# Patient Record
Sex: MALE | Race: ASIAN | NOT HISPANIC OR LATINO | Employment: FULL TIME | URBAN - METROPOLITAN AREA
[De-identification: names, ages, dates, MRNs, and addresses within clinical notes are randomized per-mention and may not be internally consistent; named-entity substitution may affect disease eponyms.]

---

## 2022-10-31 ENCOUNTER — LAB REQUISITION (OUTPATIENT)
Dept: LAB | Facility: CLINIC | Age: 32
End: 2022-10-31

## 2022-10-31 PROCEDURE — 86481 TB AG RESPONSE T-CELL SUSP: CPT | Performed by: INTERNAL MEDICINE

## 2022-11-02 LAB
GAMMA INTERFERON BACKGROUND BLD IA-ACNC: 0.02 IU/ML
M TB IFN-G BLD-IMP: NEGATIVE
M TB IFN-G CD4+ BCKGRND COR BLD-ACNC: 9.98 IU/ML
MITOGEN IGNF BCKGRD COR BLD-ACNC: 0.01 IU/ML
MITOGEN IGNF BCKGRD COR BLD-ACNC: 0.01 IU/ML
QUANTIFERON MITOGEN: 10 IU/ML
QUANTIFERON NIL TUBE: 0.02 IU/ML
QUANTIFERON TB1 TUBE: 0.03 IU/ML
QUANTIFERON TB2 TUBE: 0.03

## 2023-06-14 ENCOUNTER — OFFICE VISIT (OUTPATIENT)
Dept: FAMILY MEDICINE | Facility: CLINIC | Age: 33
End: 2023-06-14
Payer: COMMERCIAL

## 2023-06-14 VITALS
HEIGHT: 70 IN | TEMPERATURE: 97.2 F | RESPIRATION RATE: 16 BRPM | DIASTOLIC BLOOD PRESSURE: 80 MMHG | BODY MASS INDEX: 28.35 KG/M2 | SYSTOLIC BLOOD PRESSURE: 134 MMHG | HEART RATE: 92 BPM | OXYGEN SATURATION: 97 % | WEIGHT: 198 LBS

## 2023-06-14 DIAGNOSIS — G57.61 MORTON'S NEUROMA OF RIGHT FOOT: ICD-10-CM

## 2023-06-14 DIAGNOSIS — M72.2 PLANTAR FASCIITIS: Primary | ICD-10-CM

## 2023-06-14 PROCEDURE — 99203 OFFICE O/P NEW LOW 30 MIN: CPT | Performed by: FAMILY MEDICINE

## 2023-06-14 ASSESSMENT — PAIN SCALES - GENERAL: PAINLEVEL: NO PAIN (1)

## 2023-07-28 ENCOUNTER — MEDICAL CORRESPONDENCE (OUTPATIENT)
Dept: HEALTH INFORMATION MANAGEMENT | Facility: CLINIC | Age: 33
End: 2023-07-28
Payer: COMMERCIAL

## 2023-07-29 NOTE — TELEPHONE ENCOUNTER
DIAGNOSIS:   Plantar fasciitis [M72.2]  - Primary      Thomas's neuroma of right foot [G57.61]         APPOINTMENT DATE: 09/06/2023   NOTES STATUS DETAILS   OFFICE NOTE from referring provider Internal 06/14/2023 - Jacinta ARNDT    MEDICATION LIST Internal

## 2023-08-07 ENCOUNTER — VIRTUAL VISIT (OUTPATIENT)
Dept: FAMILY MEDICINE | Facility: CLINIC | Age: 33
End: 2023-08-07
Payer: COMMERCIAL

## 2023-08-07 DIAGNOSIS — M72.2 PLANTAR FASCIITIS: Primary | ICD-10-CM

## 2023-08-07 DIAGNOSIS — G57.62 MORTON'S NEURALGIA, LEFT: ICD-10-CM

## 2023-08-07 PROCEDURE — 99214 OFFICE O/P EST MOD 30 MIN: CPT | Mod: VID | Performed by: FAMILY MEDICINE

## 2023-08-07 RX ORDER — METHYLPREDNISOLONE 4 MG
TABLET, DOSE PACK ORAL
Qty: 21 TABLET | Refills: 0 | Status: SHIPPED | OUTPATIENT
Start: 2023-08-07 | End: 2023-10-18

## 2023-08-07 NOTE — PROGRESS NOTES
Alessio is a 33 year old who is being evaluated via a billable video visit.      How would you like to obtain your AVS? MyChart  If the video visit is dropped, the invitation should be resent by: Text to cell phone: [phone]  Will anyone else be joining your video visit? No    Assessment & Plan     Alessio was seen today for medication request and musculoskeletal problem.    Diagnoses and all orders for this visit:    Plantar fasciitis  -     methylPREDNISolone (MEDROL DOSEPAK) 4 MG tablet therapy pack; Follow Package Directions  -     diclofenac (VOLTAREN) 50 MG EC tablet; Take 1 tablet (50 mg) by mouth daily as needed for moderate pain (right foot)    Thomas's neuralgia, left  -     methylPREDNISolone (MEDROL DOSEPAK) 4 MG tablet therapy pack; Follow Package Directions  -     diclofenac (VOLTAREN) 50 MG EC tablet; Take 1 tablet (50 mg) by mouth daily as needed for moderate pain (right foot)      Return in about 1 month (around 9/7/2023) for Follow-up Visit with Specialist.     Yong Workman MD  [facility]   Alessio is a 33 year old, presenting for the following health issues:  Medication Request and Musculoskeletal Problem    History of Present Illness       Reason for visit:  Left sided foot pain    He eats 2-3 servings of fruits and vegetables daily.He consumes 0 sweetened beverage(s) daily.He exercises with enough effort to increase his heart rate 9 or less minutes per day.  He exercises with enough effort to increase his heart rate 3 or less days per week.   He is taking medications regularly.  The patient scheduled a follow-up visit to discuss foot pain.  Reports left foot pain started on July 21, 2023.  Pain started on the distal foot and radiated toward the heel.  Currently taking Voltaren once daily.  Symptoms started on 07/21 and lasted until 08/01/2023.   Pain is on the scale of 8/10. Sharp pain.    Review of Systems   Constitutional, HEENT, cardiovascular, pulmonary,  gi and gu systems are negative, except as otherwise noted.      Objective           Vitals:  No vitals were obtained today due to virtual visit.    Physical Exam   GENERAL: Healthy, alert and no distress  EYES: Eyes grossly normal to inspection.  No discharge or erythema, or obvious scleral/conjunctival abnormalities.  RESP: No audible wheeze, cough, or visible cyanosis.  No visible retractions or increased work of breathing.    SKIN: Visible skin clear. No significant rash, abnormal pigmentation or lesions.  NEURO: Cranial nerves grossly intact.  Mentation and speech appropriate for age.  PSYCH: Mentation appears normal, affect normal/bright, judgement and insight intact, normal speech and appearance well-groomed.    No results found for any visits on 08/07/23.        Video-Visit Details    Type of service:  Video Visit     Originating Location (pt. Location): Home    Distant Location (provider location):  On-site  Platform used for Video Visit: JuanjoseWell

## 2023-08-13 ENCOUNTER — HEALTH MAINTENANCE LETTER (OUTPATIENT)
Age: 33
End: 2023-08-13

## 2023-08-23 ENCOUNTER — E-VISIT (OUTPATIENT)
Dept: FAMILY MEDICINE | Facility: CLINIC | Age: 33
End: 2023-08-23
Payer: COMMERCIAL

## 2023-08-23 DIAGNOSIS — Z87.39 HISTORY OF GOUT: Primary | ICD-10-CM

## 2023-08-23 PROCEDURE — 99207 PR NON-BILLABLE SERV PER CHARTING: CPT | Performed by: FAMILY MEDICINE

## 2023-08-31 ENCOUNTER — LAB (OUTPATIENT)
Dept: LAB | Facility: CLINIC | Age: 33
End: 2023-08-31
Payer: COMMERCIAL

## 2023-08-31 DIAGNOSIS — Z87.39 HISTORY OF GOUT: ICD-10-CM

## 2023-08-31 LAB
ALBUMIN SERPL BCG-MCNC: 4.7 G/DL (ref 3.5–5.2)
ALP SERPL-CCNC: 84 U/L (ref 40–129)
ALT SERPL W P-5'-P-CCNC: 19 U/L (ref 0–70)
ANION GAP SERPL CALCULATED.3IONS-SCNC: 10 MMOL/L (ref 7–15)
AST SERPL W P-5'-P-CCNC: 15 U/L (ref 0–45)
BILIRUB SERPL-MCNC: 0.4 MG/DL
BUN SERPL-MCNC: 7.9 MG/DL (ref 6–20)
CALCIUM SERPL-MCNC: 9.7 MG/DL (ref 8.6–10)
CHLORIDE SERPL-SCNC: 105 MMOL/L (ref 98–107)
CREAT SERPL-MCNC: 0.93 MG/DL (ref 0.67–1.17)
CRP SERPL-MCNC: <3 MG/L
DEPRECATED HCO3 PLAS-SCNC: 26 MMOL/L (ref 22–29)
ERYTHROCYTE [DISTWIDTH] IN BLOOD BY AUTOMATED COUNT: 12.4 % (ref 10–15)
ERYTHROCYTE [SEDIMENTATION RATE] IN BLOOD BY WESTERGREN METHOD: 11 MM/HR (ref 0–15)
GFR SERPL CREATININE-BSD FRML MDRD: >90 ML/MIN/1.73M2
GLUCOSE SERPL-MCNC: 85 MG/DL (ref 70–99)
HCT VFR BLD AUTO: 42.6 % (ref 40–53)
HGB BLD-MCNC: 14.4 G/DL (ref 13.3–17.7)
MCH RBC QN AUTO: 28.8 PG (ref 26.5–33)
MCHC RBC AUTO-ENTMCNC: 33.8 G/DL (ref 31.5–36.5)
MCV RBC AUTO: 85 FL (ref 78–100)
PLATELET # BLD AUTO: 264 10E3/UL (ref 150–450)
POTASSIUM SERPL-SCNC: 3.9 MMOL/L (ref 3.4–5.3)
PROT SERPL-MCNC: 7.5 G/DL (ref 6.4–8.3)
RBC # BLD AUTO: 5 10E6/UL (ref 4.4–5.9)
SODIUM SERPL-SCNC: 141 MMOL/L (ref 136–145)
URATE SERPL-MCNC: 10.7 MG/DL (ref 3.4–7)
WBC # BLD AUTO: 5.8 10E3/UL (ref 4–11)

## 2023-08-31 PROCEDURE — 85652 RBC SED RATE AUTOMATED: CPT | Performed by: PATHOLOGY

## 2023-08-31 PROCEDURE — 80053 COMPREHEN METABOLIC PANEL: CPT | Performed by: PATHOLOGY

## 2023-08-31 PROCEDURE — 36415 COLL VENOUS BLD VENIPUNCTURE: CPT | Performed by: PATHOLOGY

## 2023-08-31 PROCEDURE — 86140 C-REACTIVE PROTEIN: CPT | Performed by: PATHOLOGY

## 2023-08-31 PROCEDURE — 85027 COMPLETE CBC AUTOMATED: CPT | Performed by: PATHOLOGY

## 2023-08-31 PROCEDURE — 84550 ASSAY OF BLOOD/URIC ACID: CPT | Performed by: PATHOLOGY

## 2023-09-01 ENCOUNTER — MYC MEDICAL ADVICE (OUTPATIENT)
Dept: FAMILY MEDICINE | Facility: CLINIC | Age: 33
End: 2023-09-01
Payer: COMMERCIAL

## 2023-09-01 DIAGNOSIS — Z87.39 HISTORY OF GOUT: Primary | ICD-10-CM

## 2023-09-01 NOTE — RESULT ENCOUNTER NOTE
Dear Alessio,   Your results revealed an elevated uric acid level. The goal is to bring your uric acid level to less than 7.0. I would recommend starting a medication to prevent gout flare-ups. Let me know which pharmacy to use.         Best Regards  Yong Workman MD

## 2023-09-06 ENCOUNTER — PRE VISIT (OUTPATIENT)
Dept: ORTHOPEDICS | Facility: CLINIC | Age: 33
End: 2023-09-06

## 2023-09-06 ENCOUNTER — ANCILLARY PROCEDURE (OUTPATIENT)
Dept: CT IMAGING | Facility: CLINIC | Age: 33
End: 2023-09-06
Attending: PODIATRIST
Payer: COMMERCIAL

## 2023-09-06 ENCOUNTER — OFFICE VISIT (OUTPATIENT)
Dept: ORTHOPEDICS | Facility: CLINIC | Age: 33
End: 2023-09-06
Attending: FAMILY MEDICINE
Payer: COMMERCIAL

## 2023-09-06 DIAGNOSIS — M79.672 LEFT FOOT PAIN: ICD-10-CM

## 2023-09-06 DIAGNOSIS — M1A.00X0 CHRONIC GOUTY ARTHRITIS: ICD-10-CM

## 2023-09-06 DIAGNOSIS — M1A.00X0 CHRONIC GOUTY ARTHRITIS: Primary | ICD-10-CM

## 2023-09-06 PROCEDURE — 99203 OFFICE O/P NEW LOW 30 MIN: CPT | Performed by: PODIATRIST

## 2023-09-06 PROCEDURE — 73700 CT LOWER EXTREMITY W/O DYE: CPT | Mod: LT | Performed by: RADIOLOGY

## 2023-09-06 RX ORDER — ALLOPURINOL 100 MG/1
100 TABLET ORAL DAILY
Qty: 90 TABLET | Refills: 0 | Status: SHIPPED | OUTPATIENT
Start: 2023-09-06 | End: 2023-10-20

## 2023-09-06 NOTE — TELEPHONE ENCOUNTER
FS,  Please see below Connect[employer] System[employer] message and advise.  Pended pharmacy  Thanks,  Joanne ROGER RN

## 2023-09-06 NOTE — PROGRESS NOTES
Date of Service: 9/6/2023    Chief Complaint:   Chief Complaint   Patient presents with    Consult     Plantar fasciitis        HPI: Alessio is a 33 year old male who presents today for further evaluation of left foot pain.  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  He relates that a few weeks ago, he had significant pain and swelling in the left foot.  He was unable to walk on the foot.  He did have pain in the bottom of foot but it was also red and swollen on the top of the foot.  He did show me a picture of this.  He has had a previous high uric acid.  He notes that this happened previously on the right side.  There was no inciting trauma to the foot.  He was started on diclofenac and Medrol dose pack.  This did help to decrease his symptoms.  He is not having pain currently.    Review of Systems: Answers submitted by the patient for this visit:  Symptoms you have experienced in the last 30 days (Submitted on 9/6/2023)  General Symptoms: No  Skin Symptoms: No  HENT Symptoms: No  EYE SYMPTOMS: No  HEART SYMPTOMS: No  LUNG SYMPTOMS: No  INTESTINAL SYMPTOMS: No  URINARY SYMPTOMS: No  REPRODUCTIVE SYMPTOMS: No  SKELETAL SYMPTOMS: No  BLOOD SYMPTOMS: No  NERVOUS SYSTEM SYMPTOMS: No  MENTAL HEALTH SYMPTOMS: No      PMH: No past medical history on file.    PSxH: No past surgical history on file.    Allergies: Patient has no known allergies.    SH:   Social History     Socioeconomic History    Marital status:      Spouse name: Not on file    Number of children: Not on file    Years of education: Not on file    Highest education level: Not on file   Occupational History    Not on file   Tobacco Use    Smoking status: Never    Smokeless tobacco: Never   Substance and Sexual Activity    Alcohol use: Not on file    Drug use: Not on file    Sexual activity: Not on file   Other Topics Concern    Not on file   Social History Narrative    Not on file     Social Determinants of Health     Financial Resource Strain:  Not on file   Food Insecurity: Not on file   Transportation Needs: Not on file   Physical Activity: Not on file   Stress: Not on file   Social Connections: Not on file   Intimate Partner Violence: Not on file   Housing Stability: Not on file       FH: No family history on file.    Objective:  Uric Acid   Date Value Ref Range Status   08/31/2023 10.7 (H) 3.4 - 7.0 mg/dL Final         PT and DP pulses are 2/4 bilaterally. CRT is instant. Positive pedal hair.   Gross sensation is intact bilaterally.   Equinus is noted bilaterally. No pain with active or passive ROM of the ankle, MTJ, 1st ray, or halluces bilaterally,.  There is no erythema or edema noted to bilateral feet today.  No pain noted with palpation of any part of the foot.  Nails normal bilaterally. No open lesions are noted.     Assessment:   Encounter Diagnoses   Name Primary?    Chronic gouty arthritis Yes    Left foot pain          Plan:  - Pt seen and evaluated.  -This does not appear to be plantar fasciitis.  This likely would not have improved significantly with the steroid and diclofenac.  I am concerned that he had a gout attack.  I will get him a dual-energy CT scan.  Otherwise, is not having pain right now.  -See again after CT scan is performed.

## 2023-09-06 NOTE — TELEPHONE ENCOUNTER
Medication sent to his pharmacy on file.   Advise patient to schedule a lab only visit in 2 months for uric acid recheck.   MD Jacinta

## 2023-09-06 NOTE — LETTER
9/6/2023         RE: Alessio Fuentes  [address]  [address]  [place] [facility] [place] [address]        Dear Colleague,    Thank you for referring your patient, Alessio Fuentes, to the [facility] [place]. Please see a copy of my visit note below.    Date of Service: 9/6/2023    Chief Complaint:   Chief Complaint   Patient presents with    Consult     Plantar fasciitis        HPI: Alessio is a 33 year old male who presents today for further evaluation of left foot pain.  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  He relates that a few weeks ago, he had significant pain and swelling in the left foot.  He was unable to walk on the foot.  He did have pain in the bottom of foot but it was also red and swollen on the top of the foot.  He did show me a picture of this.  He has had a previous high uric acid.  He notes that this happened previously on the right side.  There was no inciting trauma to the foot.  He was started on diclofenac and Medrol dose pack.  This did help to decrease his symptoms.  He is not having pain currently.    Review of Systems: Answers submitted by the patient for this visit:  Symptoms you have experienced in the last 30 days (Submitted on 9/6/2023)  General Symptoms: No  Skin Symptoms: No  HENT Symptoms: No  EYE SYMPTOMS: No  HEART SYMPTOMS: No  LUNG SYMPTOMS: No  INTESTINAL SYMPTOMS: No  URINARY SYMPTOMS: No  REPRODUCTIVE SYMPTOMS: No  SKELETAL SYMPTOMS: No  BLOOD SYMPTOMS: No  NERVOUS SYSTEM SYMPTOMS: No  MENTAL HEALTH SYMPTOMS: No      PMH: No past medical history on file.    PSxH: No past surgical history on file.    Allergies: Patient has no known allergies.    SH:   Social History     Socioeconomic History    Marital status:      Spouse name: Not on file    Number of children: Not on file    Years of education: Not on file    Highest education level: Not on file   Occupational History    Not on file   Tobacco Use    Smoking status: Never    Smokeless  tobacco: Never   Substance and Sexual Activity    Alcohol use: Not on file    Drug use: Not on file    Sexual activity: Not on file   Other Topics Concern    Not on file   Social History Narrative    Not on file     Social Determinants of Health     Financial Resource Strain: Not on file   Food Insecurity: Not on file   Transportation Needs: Not on file   Physical Activity: Not on file   Stress: Not on file   Social Connections: Not on file   Intimate Partner Violence: Not on file   Housing Stability: Not on file       FH: No family history on file.    Objective:  Uric Acid   Date Value Ref Range Status   08/31/2023 10.7 (H) 3.4 - 7.0 mg/dL Final         PT and DP pulses are 2/4 bilaterally. CRT is instant. Positive pedal hair.   Gross sensation is intact bilaterally.   Equinus is noted bilaterally. No pain with active or passive ROM of the ankle, MTJ, 1st ray, or halluces bilaterally,.  There is no erythema or edema noted to bilateral feet today.  No pain noted with palpation of any part of the foot.  Nails normal bilaterally. No open lesions are noted.     Assessment:   Encounter Diagnoses   Name Primary?    Chronic gouty arthritis Yes    Left foot pain          Plan:  - Pt seen and evaluated.  -This does not appear to be plantar fasciitis.  This likely would not have improved significantly with the steroid and diclofenac.  I am concerned that he had a gout attack.  I will get him a dual-energy CT scan.  Otherwise, is not having pain right now.  -See again after CT scan is performed.           Parrish Bowles DPM

## 2023-09-13 ENCOUNTER — ANCILLARY PROCEDURE (OUTPATIENT)
Dept: ULTRASOUND IMAGING | Facility: CLINIC | Age: 33
End: 2023-09-13
Attending: PODIATRIST
Payer: COMMERCIAL

## 2023-09-13 DIAGNOSIS — M79.672 LEFT FOOT PAIN: ICD-10-CM

## 2023-09-13 DIAGNOSIS — M79.672 LEFT FOOT PAIN: Primary | ICD-10-CM

## 2023-09-13 PROCEDURE — 76882 US LMTD JT/FCL EVL NVASC XTR: CPT | Mod: LT | Performed by: RADIOLOGY

## 2023-09-15 ENCOUNTER — OFFICE VISIT (OUTPATIENT)
Dept: ORTHOPEDICS | Facility: CLINIC | Age: 33
End: 2023-09-15
Payer: COMMERCIAL

## 2023-09-15 DIAGNOSIS — M79.672 LEFT FOOT PAIN: Primary | ICD-10-CM

## 2023-09-15 PROCEDURE — 99213 OFFICE O/P EST LOW 20 MIN: CPT | Performed by: PODIATRIST

## 2023-09-15 NOTE — LETTER
9/15/2023         RE: Alessio Fuentes  [address]  [address]  [place] [address]        Dear Colleague,    Thank you for referring your patient, Alessio Fuentes, to the [facility] [place]. Please see a copy of my visit note below.    Chief Complaint   Patient presents with    RECHECK     CT follow up            HPI: Alessio is a 33 year old male who presents today for further evaluation of left foot pain.  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  He relates that a few weeks ago, he had significant pain and swelling in the left foot.  He was unable to walk on the foot.  He did have pain in the bottom of foot but it was also red and swollen on the top of the foot.  He did show me a picture of this.  He has had a previous high uric acid.  He notes that this happened previously on the right side.  There was no inciting trauma to the foot.  He was started on diclofenac and Medrol dose pack.  This did help to decrease his symptoms.  He is not having pain currently.    Interval history: He did have the CT and ultrasound performed.  He has some uncomfortable sensation when he is walking on the left foot.    Review of Systems: No n/v/d/f/c/ns/sob/cp      PMH: No past medical history on file.    PSxH: No past surgical history on file.    Allergies: Patient has no known allergies.    SH:   Social History     Socioeconomic History    Marital status:      Spouse name: Not on file    Number of children: Not on file    Years of education: Not on file    Highest education level: Not on file   Occupational History    Not on file   Tobacco Use    Smoking status: Never    Smokeless tobacco: Never   Substance and Sexual Activity    Alcohol use: Not on file    Drug use: Not on file    Sexual activity: Not on file   Other Topics Concern    Not on file   Social History Narrative    Not on file     Social Determinants of Health     Financial Resource Strain: Not on file   Food  Insecurity: Not on file   Transportation Needs: Not on file   Physical Activity: Not on file   Stress: Not on file   Social Connections: Not on file   Intimate Partner Violence: Not on file   Housing Stability: Not on file       FH: No family history on file.    Objective:  Uric Acid   Date Value Ref Range Status   08/31/2023 10.7 (H) 3.4 - 7.0 mg/dL Final         PT and DP pulses are 2/4 bilaterally. CRT is instant. Positive pedal hair.   Gross sensation is intact bilaterally.   Equinus is noted bilaterally. No pain with active or passive ROM of the ankle, MTJ, 1st ray, or halluces bilaterally,.  There is no erythema or edema noted to bilateral feet today.  Some discomfort noted with palpation of the fourth metatarsal head on the left side.  Nails normal bilaterally. No open lesions are noted.     CT Impression: No evidence of monosodium urate crystal deposition.     MELO BENITEZ     Impression:      Normal sonographic appearance of the left plantar fascia. No evidence  of interdigital neuroma.     DENIZ LLANOS MD (Joe)        Assessment:   No diagnosis found.  Encounter Diagnoses   Name Primary?    Left foot pain Yes           Plan:  - Pt seen and evaluated.  -He is improving.  I recommend that he use a Budin splint for the fourth metatarsal pain.  If this pain happens again, recommend he send me a MyChart we will see him that there the day after.  -See again after PRN.         Parrish Bowles DPM

## 2023-09-15 NOTE — PROGRESS NOTES
Chief Complaint   Patient presents with    RECHECK     CT follow up            HPI: Alessio is a 33 year old male who presents today for further evaluation of left foot pain.  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  He relates that a few weeks ago, he had significant pain and swelling in the left foot.  He was unable to walk on the foot.  He did have pain in the bottom of foot but it was also red and swollen on the top of the foot.  He did show me a picture of this.  He has had a previous high uric acid.  He notes that this happened previously on the right side.  There was no inciting trauma to the foot.  He was started on diclofenac and Medrol dose pack.  This did help to decrease his symptoms.  He is not having pain currently.    Interval history: He did have the CT and ultrasound performed.  He has some uncomfortable sensation when he is walking on the left foot.    Review of Systems: No n/v/d/f/c/ns/sob/cp      PMH: No past medical history on file.    PSxH: No past surgical history on file.    Allergies: Patient has no known allergies.    SH:   Social History     Socioeconomic History    Marital status:      Spouse name: Not on file    Number of children: Not on file    Years of education: Not on file    Highest education level: Not on file   Occupational History    Not on file   Tobacco Use    Smoking status: Never    Smokeless tobacco: Never   Substance and Sexual Activity    Alcohol use: Not on file    Drug use: Not on file    Sexual activity: Not on file   Other Topics Concern    Not on file   Social History Narrative    Not on file     Social Determinants of Health     Financial Resource Strain: Not on file   Food Insecurity: Not on file   Transportation Needs: Not on file   Physical Activity: Not on file   Stress: Not on file   Social Connections: Not on file   Intimate Partner Violence: Not on file   Housing Stability: Not on file       FH: No family history on file.    Objective:  Uric  Acid   Date Value Ref Range Status   08/31/2023 10.7 (H) 3.4 - 7.0 mg/dL Final         PT and DP pulses are 2/4 bilaterally. CRT is instant. Positive pedal hair.   Gross sensation is intact bilaterally.   Equinus is noted bilaterally. No pain with active or passive ROM of the ankle, MTJ, 1st ray, or halluces bilaterally,.  There is no erythema or edema noted to bilateral feet today.  Some discomfort noted with palpation of the fourth metatarsal head on the left side.  Nails normal bilaterally. No open lesions are noted.     CT Impression: No evidence of monosodium urate crystal deposition.     MELO EMMANUEL     Impression:      Normal sonographic appearance of the left plantar fascia. No evidence  of interdigital neuroma.     DENIZ LLANOS MD (Joe)        Assessment:   No diagnosis found.  Encounter Diagnoses   Name Primary?    Left foot pain Yes           Plan:  - Pt seen and evaluated.  -He is improving.  I recommend that he use a Budin splint for the fourth metatarsal pain.  If this pain happens again, recommend he send me a MyChart we will see him that there the day after.  -See again after PRN.

## 2023-09-15 NOTE — NURSING NOTE
Reason For Visit:   Chief Complaint   Patient presents with    RECHECK     CT follow up       Pain Assessment  Patient Currently in Pain: Bee Cooley

## 2023-10-02 DIAGNOSIS — G57.62 MORTON'S NEURALGIA, LEFT: ICD-10-CM

## 2023-10-02 DIAGNOSIS — M72.2 PLANTAR FASCIITIS: ICD-10-CM

## 2023-10-03 ENCOUNTER — MYC MEDICAL ADVICE (OUTPATIENT)
Dept: FAMILY MEDICINE | Facility: CLINIC | Age: 33
End: 2023-10-03
Payer: COMMERCIAL

## 2023-10-14 ENCOUNTER — MYC MEDICAL ADVICE (OUTPATIENT)
Dept: FAMILY MEDICINE | Facility: CLINIC | Age: 33
End: 2023-10-14
Payer: COMMERCIAL

## 2023-10-17 NOTE — COMMUNITY RESOURCES LIST (ENGLISH)
10/17/2023   [facility]  N/A  For questions about this resource list or additional care needs, please contact your primary care clinic or care manager.  Phone: [phone]   Email: N/A   Address: [address] [place] [address]   Hours: N/A        Hotlines and Helplines       Hotline - Housing crisis  1  [facility] Distance: 1.15 miles      Phone/Virtual   [address] [place] [address]  Language: English  Hours: Mon - Sun Open 24 Hours  Fees: Free   Phone: (347) 270-2953 Email: info@[employer].org Website: http://www.[facility].org/     2  [facility] Distance: 1.19 miles      Phone/Virtual   [address] [place] [address]  Language: English  Hours: Mon - Sun Open 24 Hours   Phone: (456) 710-1262 Email: communications@[facility].org Website: https://[facility]-[place].org/oursaviourshousing/          Housing       Coordinated Entry access point  3  [facility] Distance: 1.15 miles      In-Person   [address] [place] [address]  Language: English  Hours: Mon - Sat 10:00 PM - 5:00 PM  Fees: Free   Phone: (139) 266-1726 Email: info@[employer].org Website: http://www.[facility].org/     4  [facility] - Housing Services Distance: 1.39 miles      In-Person   [address] [place] [address]  Language: English  Hours: Mon - Fri 9:00 AM - 5:00 PM  Fees: Free   Phone: (394) 494-8567 Email: housing@[facility].org Website: http://www.[facility].org/housing     Drop-in center or day shelter  5  [facility] Distance: 0.93 miles      In-Person   [address] [place] [address]  Language: English, Gabonese, Greek  Hours: Mon - Sat 7:00 AM - 3:00 PM  Fees: Free, Self Pay   Phone: (771) 537-2372 Email: info@[employer].org Website: https://www.[facility].org/locations/opportunity-center/     6  [facility]  Distance: 1.21 miles      In-Person   [address] [place] [address]  Language: English  Hours: Mon - Fri 12:00 PM - 3:00 PM  Fees: Free   Phone: (587) 421-3831 Email: timothy[employer] PharmaHub@[employer] Website: http://[facility].ResiModel/     Housing search assistance  7  [facility] - Human Services - Housing and Shelter - Homeless to Housing Distance: 0.74 miles      Phone/Virtual   [address] [facility] [address] [place] [address]  Language: English  Hours: Mon - Fri 9:00 AM - 4:00 PM  Fees: Free   Phone: (331) 995-6005 Website: https://www.[facility]./residents#human-services     8  [facility] ([facility] Distance: 0.8 miles      In-Person   [address] [place] [address]  Language: English, Mosotho, Pashto  Hours: Mon - Fri 8:30 AM - 4:30 PM  Fees: Free   Phone: (616) 326-7087 Email: darrel@[facility].org Website: http://www.[facility]-[place].org/     Shelter for families  9  [facility] Distance: 17.75 miles      In-Person   [address] [place] [address]  Language: English  Hours: Mon - Fri 3:00 PM - 9:00 AM , Sat - Sun Open 24 Hours  Fees: Free   Phone: (706) 189-9030 Ext.1 Website: https://www.[facility].org/2020/07/03/emergency-family-shelter/     Shelter for individuals  10  [facility] Distance: 1.19 miles      In-Person   [address] [place] [address]  Language: English  Hours: Mon - Sun Open 24 Hours  Fees: Free   Phone: (678) 206-1942 Email: communications@\[facility]\""Roombeatsmn.org Website: https://\[facility]\""-[place].org/oursaviourshousing/     11  [facility] Distance: 1.65 miles      In-Person   [address] [place] [address]  Language: English  Hours: Mon - Fri 4:00 PM - 9:00 AM  Fees: Free   Phone: (333) 234-7236 Email: beth@[facility].org Website: https://[facility] Centery.org/[facility]/          Important Numbers & Websites       Emergency  Services   911  [facility]   311  Poison Control   (164) 566-7473  Suicide Prevention Lifeline   (502) 166-9595 (TALK)  Child Abuse Hotline   (294) 150-3725 (4-A-Child)  Sexual Assault Hotline   (384) 928-5318 (HOPE)  National Runaway Safeline   (540) 723-8471 (RUNAWAY)  All-Options Talkline   (583) 704-7122  Substance Abuse Referral   (197) 768-5815 (HELP)

## 2023-10-18 ENCOUNTER — VIRTUAL VISIT (OUTPATIENT)
Dept: FAMILY MEDICINE | Facility: CLINIC | Age: 33
End: 2023-10-18
Payer: COMMERCIAL

## 2023-10-18 DIAGNOSIS — G57.62 MORTON'S NEURALGIA, LEFT: ICD-10-CM

## 2023-10-18 DIAGNOSIS — M72.2 PLANTAR FASCIITIS: ICD-10-CM

## 2023-10-18 DIAGNOSIS — M10.9 ACUTE GOUTY ARTHROPATHY: Primary | ICD-10-CM

## 2023-10-18 PROCEDURE — 99213 OFFICE O/P EST LOW 20 MIN: CPT | Mod: VID | Performed by: FAMILY MEDICINE

## 2023-10-18 RX ORDER — METHYLPREDNISOLONE 4 MG
TABLET, DOSE PACK ORAL
Qty: 21 TABLET | Refills: 0 | Status: SHIPPED | OUTPATIENT
Start: 2023-10-18

## 2023-10-18 RX ORDER — ALLOPURINOL 300 MG/1
300 TABLET ORAL DAILY
Qty: 90 TABLET | Refills: 0 | Status: SHIPPED | OUTPATIENT
Start: 2023-10-18 | End: 2024-01-12

## 2023-10-18 NOTE — PROGRESS NOTES
Alessio is a 33 year old who is being evaluated via a billable video visit.      How would you like to obtain your AVS? MyChart  If the video visit is dropped, the invitation should be resent by: Text to cell phone: [phone]  Will anyone else be joining your video visit? No    Assessment & Plan     Plantar fasciitis  Thomas's neuralgia, left  Acute gouty arthropathy  Previous results revealed a significantly elevated uric acid level of 10.6.  Patient is due for uric acid recheck.  I increased his allopurinol dosage to 300 mg.  He was given a prescription for Medrol pack to treat any acute flareups in the future.  - allopurinol (ZYLOPRIM) 300 MG tablet; Take 1 tablet (300 mg) by mouth daily  - methylPREDNISolone (MEDROL DOSEPAK) 4 MG tablet therapy pack; Follow Package Directions    Yong Workman MD  [facility]   Alessio is a 33 year old, presenting for the following health issues:  No chief complaint on file.    History of Present Illness       Reason for visit:  Gout flare up    He eats 2-3 servings of fruits and vegetables daily.He consumes 0 sweetened beverage(s) daily.He exercises with enough effort to increase his heart rate 20 to 29 minutes per day.  He exercises with enough effort to increase his heart rate 3 or less days per week.   He is taking medications regularly.  Patient scheduled a virtual visit to discuss recent gout flareup.  He continues to take allopurinol 100 mg once daily.  Episode consisted of left foot and ankle swelling and erythema.  Took diclofenac and acetminophen for pain.     Review of Systems   Constitutional, HEENT, cardiovascular, pulmonary, gi and gu systems are negative, except as otherwise noted.      Objective           Vitals:  No vitals were obtained today due to virtual visit.    Physical Exam   GENERAL: Healthy, alert and no distress  EYES: Eyes grossly normal to inspection.  No discharge or erythema, or obvious scleral/conjunctival  abnormalities.  RESP: No audible wheeze, cough, or visible cyanosis.  No visible retractions or increased work of breathing.    SKIN: Visible skin clear. No significant rash, abnormal pigmentation or lesions.  NEURO: Cranial nerves grossly intact.  Mentation and speech appropriate for age.  PSYCH: Mentation appears normal, affect normal/bright, judgement and insight intact, normal speech and appearance well-groomed.            Video-Visit Details    Type of service:  Video Visit     Originating Location (pt. Location): Home  Distant Location (provider location):  On-site  Platform used for Video Visit: Don

## 2023-10-19 ENCOUNTER — LAB (OUTPATIENT)
Dept: LAB | Facility: CLINIC | Age: 33
End: 2023-10-19
Payer: COMMERCIAL

## 2023-10-19 DIAGNOSIS — Z87.39 HISTORY OF GOUT: ICD-10-CM

## 2023-10-19 LAB — URATE SERPL-MCNC: 7.9 MG/DL (ref 3.4–7)

## 2023-10-19 PROCEDURE — 84550 ASSAY OF BLOOD/URIC ACID: CPT | Performed by: PATHOLOGY

## 2023-10-19 PROCEDURE — 36415 COLL VENOUS BLD VENIPUNCTURE: CPT | Performed by: PATHOLOGY

## 2023-10-20 DIAGNOSIS — M10.9 ACUTE GOUTY ARTHROPATHY: Primary | ICD-10-CM

## 2023-10-20 NOTE — RESULT ENCOUNTER NOTE
Dear Alessio,   Here are your recent results. Your uric acid level is still high at 7.9. As we discussed previously, increase your allopurinol to 300mg once daily   Schedule a lab only visit in 3 months to check your uric acid level.   Best Regards   Yong Workman MD

## 2024-01-08 ENCOUNTER — LAB (OUTPATIENT)
Dept: LAB | Facility: CLINIC | Age: 34
End: 2024-01-08
Payer: COMMERCIAL

## 2024-01-08 DIAGNOSIS — M10.9 ACUTE GOUTY ARTHROPATHY: ICD-10-CM

## 2024-01-08 LAB — URATE SERPL-MCNC: 4.7 MG/DL (ref 3.4–7)

## 2024-01-08 PROCEDURE — 36415 COLL VENOUS BLD VENIPUNCTURE: CPT | Performed by: PATHOLOGY

## 2024-01-08 PROCEDURE — 84550 ASSAY OF BLOOD/URIC ACID: CPT | Performed by: PATHOLOGY

## 2024-01-12 ENCOUNTER — MYC REFILL (OUTPATIENT)
Dept: FAMILY MEDICINE | Facility: CLINIC | Age: 34
End: 2024-01-12
Payer: COMMERCIAL

## 2024-01-12 DIAGNOSIS — M10.9 ACUTE GOUTY ARTHROPATHY: ICD-10-CM

## 2024-01-15 RX ORDER — ALLOPURINOL 300 MG/1
300 TABLET ORAL DAILY
Qty: 90 TABLET | Refills: 0 | Status: SHIPPED | OUTPATIENT
Start: 2024-01-15 | End: 2024-04-24

## 2024-04-24 DIAGNOSIS — M10.9 ACUTE GOUTY ARTHROPATHY: ICD-10-CM

## 2024-04-24 RX ORDER — ALLOPURINOL 300 MG/1
300 TABLET ORAL DAILY
Qty: 90 TABLET | Refills: 0 | Status: SHIPPED | OUTPATIENT
Start: 2024-04-24 | End: 2024-07-25

## 2024-04-24 NOTE — TELEPHONE ENCOUNTER
Prescription approved per [facility] Refill Protocol.  Umu Vasquez, RN  [facility] Triage Nurse

## 2024-07-25 DIAGNOSIS — M10.9 ACUTE GOUTY ARTHROPATHY: ICD-10-CM

## 2024-07-25 RX ORDER — ALLOPURINOL 300 MG/1
300 TABLET ORAL DAILY
Qty: 90 TABLET | Refills: 0 | Status: SHIPPED | OUTPATIENT
Start: 2024-07-25

## 2024-10-06 ENCOUNTER — HEALTH MAINTENANCE LETTER (OUTPATIENT)
Age: 34
End: 2024-10-06

## 2024-11-23 ENCOUNTER — MYC REFILL (OUTPATIENT)
Dept: FAMILY MEDICINE | Facility: CLINIC | Age: 34
End: 2024-11-23
Payer: COMMERCIAL

## 2024-11-23 DIAGNOSIS — M10.9 ACUTE GOUTY ARTHROPATHY: ICD-10-CM

## 2024-11-25 RX ORDER — ALLOPURINOL 300 MG/1
300 TABLET ORAL DAILY
Qty: 90 TABLET | Refills: 0 | Status: SHIPPED | OUTPATIENT
Start: 2024-11-25

## 2025-02-22 DIAGNOSIS — M10.9 ACUTE GOUTY ARTHROPATHY: ICD-10-CM

## 2025-02-25 RX ORDER — ALLOPURINOL 300 MG/1
TABLET ORAL
Qty: 90 TABLET | Refills: 0 | OUTPATIENT
Start: 2025-02-25

## 2025-03-13 ENCOUNTER — VIRTUAL VISIT (OUTPATIENT)
Dept: FAMILY MEDICINE | Facility: CLINIC | Age: 35
End: 2025-03-13
Payer: COMMERCIAL

## 2025-03-13 DIAGNOSIS — M10.9 ACUTE GOUTY ARTHROPATHY: ICD-10-CM

## 2025-03-13 RX ORDER — ALLOPURINOL 300 MG/1
300 TABLET ORAL DAILY
Qty: 90 TABLET | Refills: 1 | Status: SHIPPED | OUTPATIENT
Start: 2025-03-13

## 2025-03-13 NOTE — PROGRESS NOTES
"Alessio is a 34 year old who is being evaluated via a billable video visit.    How would you like to obtain your AVS? [employer]  If the video visit is dropped, the invitation should be resent by: Text to cell phone: [phone]  Will anyone else be joining your video visit? No      Assessment & Plan     Acute gouty arthropathy  He is about to run out of his medication. Refill sent through and he'll get labs checked soon. Follow-up with PCP for annual.     - Uric acid; Future  - allopurinol (ZYLOPRIM) 300 MG tablet; Take 1 tablet (300 mg) by mouth daily.            Patient Instructions   Allopurinol was refilled.     Labs ordered today. You can schedule a \"lab only\" appointment at your convenience at any [facility] clinic by going into your [employer] or by calling our scheduling line. Those will be available via [employer] within a couple days after you have them drawn. You will get a response from me shortly thereafter.     Follow-up with PCP for annual physical at your convenience.     Subjective   Alessio is a 34 year old, presenting for the following health issues:  Recheck Medication        3/13/2025     3:22 PM   Additional Questions   Roomed by Karina   Accompanied by none         3/13/2025     3:22 PM   Patient Reported Additional Medications   Patient reports taking the following new medications none     History of Present Illness       Reason for visit:  Medication refill    He eats 2-3 servings of fruits and vegetables daily.He consumes 2 sweetened beverage(s) daily.He exercises with enough effort to increase his heart rate 30 to 60 minutes per day.  He exercises with enough effort to increase his heart rate 3 or less days per week.   He is taking medications regularly.          Medication Followup of allopurinol   Taking Medication as prescribed: yes  Side Effects:  None  Medication Helping Symptoms:  yes  Patient has a history of gout in bilateral feet, worse on the Left side, on the upper part of his foot below his " toes, controlled since he has been on this medication     Additional provider notes: Patient presents for the following:     Gout: takes allopurinol 300mg. Uric acid level was 4.7 on 1/8/24. Has been on this for years. No break through gouty attacks.     No Known Allergies    Current Outpatient Medications   Medication Sig Dispense Refill    allopurinol (ZYLOPRIM) 300 MG tablet Take 1 tablet (300 mg) by mouth daily. 90 tablet 0    diclofenac (VOLTAREN) 50 MG EC tablet Take 1 tablet (50 mg) by mouth daily as needed for moderate pain (right foot) 30 tablet 1    methylPREDNISolone (MEDROL DOSEPAK) 4 MG tablet therapy pack Follow Package Directions 21 tablet 0     No current facility-administered medications for this visit.       No past medical history on file.         Review of Systems  Constitutional, HEENT, cardiovascular, pulmonary, gi and gu systems are negative, except as otherwise noted.      Objective           Vitals:  No vitals were obtained today due to virtual visit.    Physical Exam   GENERAL: alert and no distress  EYES: Eyes grossly normal to inspection.  No discharge or erythema, or obvious scleral/conjunctival abnormalities.  RESP: No audible wheeze, cough, or visible cyanosis.    SKIN: Visible skin clear. No significant rash, abnormal pigmentation or lesions.  NEURO: Cranial nerves grossly intact.  Mentation and speech appropriate for age.  PSYCH: Appropriate affect, tone, and pace of words          Video-Visit Details    Type of service:  Video Visit   *3 minutes 32 seconds  Originating Location (pt. Location): Home    Distant Location (provider location):  On-site  Platform used for Video Visit: Don  Signed Electronically by: Eliana Catalan DNP

## 2025-03-13 NOTE — PATIENT INSTRUCTIONS
"Allopurinol was refilled.     Labs ordered today. You can schedule a \"lab only\" appointment at your convenience at any [facility] by going into your mychart or by calling our scheduling line. Those will be available via [employer] within a couple days after you have them drawn. You will get a response from me shortly thereafter.     Follow-up with PCP for annual physical at your convenience.   "

## 2025-03-14 ENCOUNTER — LAB (OUTPATIENT)
Dept: LAB | Facility: CLINIC | Age: 35
End: 2025-03-14
Payer: COMMERCIAL

## 2025-03-14 DIAGNOSIS — M10.9 ACUTE GOUTY ARTHROPATHY: ICD-10-CM

## 2025-03-14 LAB — URATE SERPL-MCNC: 5.7 MG/DL (ref 3.4–7)

## 2025-03-14 PROCEDURE — 36415 COLL VENOUS BLD VENIPUNCTURE: CPT

## 2025-03-14 PROCEDURE — 84550 ASSAY OF BLOOD/URIC ACID: CPT

## 2025-06-09 ENCOUNTER — MYC REFILL (OUTPATIENT)
Dept: FAMILY MEDICINE | Facility: CLINIC | Age: 35
End: 2025-06-09
Payer: COMMERCIAL

## 2025-06-09 DIAGNOSIS — M10.9 ACUTE GOUTY ARTHROPATHY: ICD-10-CM

## 2025-06-10 RX ORDER — ALLOPURINOL 300 MG/1
300 TABLET ORAL DAILY
Qty: 90 TABLET | Refills: 0 | Status: SHIPPED | OUTPATIENT
Start: 2025-06-10

## 2025-06-10 NOTE — TELEPHONE ENCOUNTER
90 day refill sent. Please let patient know that he is due to labs to check kidney function. I have placed those labs and he can schedule a lab only appointment at his convenience prior to next refill.       Thanks,  Eliana Catalan DNP, NP-C